# Patient Record
Sex: MALE | Race: WHITE | HISPANIC OR LATINO | URBAN - METROPOLITAN AREA
[De-identification: names, ages, dates, MRNs, and addresses within clinical notes are randomized per-mention and may not be internally consistent; named-entity substitution may affect disease eponyms.]

---

## 2023-04-04 ENCOUNTER — OFFICE VISIT (OUTPATIENT)
Dept: URBAN - METROPOLITAN AREA CLINIC 29 | Facility: CLINIC | Age: 23
End: 2023-04-04
Payer: COMMERCIAL

## 2023-04-04 ENCOUNTER — WEB ENCOUNTER (OUTPATIENT)
Dept: URBAN - METROPOLITAN AREA CLINIC 29 | Facility: CLINIC | Age: 23
End: 2023-04-04

## 2023-04-04 VITALS
HEART RATE: 78 BPM | HEIGHT: 69 IN | WEIGHT: 215 LBS | SYSTOLIC BLOOD PRESSURE: 134 MMHG | BODY MASS INDEX: 31.84 KG/M2 | DIASTOLIC BLOOD PRESSURE: 83 MMHG

## 2023-04-04 DIAGNOSIS — K52.9 COLITIS: ICD-10-CM

## 2023-04-04 DIAGNOSIS — K92.1 HEMATOCHEZIA: ICD-10-CM

## 2023-04-04 PROCEDURE — 99203 OFFICE O/P NEW LOW 30 MIN: CPT | Performed by: INTERNAL MEDICINE

## 2023-04-04 NOTE — HPI-TODAY'S VISIT:
Mr. Kirk is a 22-year-old man who presents with constipation.  He was started on pain medication after ankle surgery 1 week ago.  He was not able to have a BM for 4 days.  He took an OTC laxative and developed abdominal pain, diarrhea and rectal bleeding.  He was 3-4 bowel movements a day.  He has been on Cipro/Flagyl for 4 days and up until yesterday was still having bloody stools.  He had a CT which showed colitis.

## 2023-04-17 ENCOUNTER — OFFICE VISIT (OUTPATIENT)
Dept: URBAN - METROPOLITAN AREA CLINIC 84 | Facility: CLINIC | Age: 23
End: 2023-04-17

## 2023-05-09 ENCOUNTER — OFFICE VISIT (OUTPATIENT)
Dept: URBAN - METROPOLITAN AREA TELEHEALTH 2 | Facility: TELEHEALTH | Age: 23
End: 2023-05-09
Payer: COMMERCIAL

## 2023-05-09 DIAGNOSIS — K52.9 COLITIS: ICD-10-CM

## 2023-05-09 PROCEDURE — 99214 OFFICE O/P EST MOD 30 MIN: CPT | Performed by: INTERNAL MEDICINE

## 2023-05-09 NOTE — HPI-TODAY'S VISIT:
Mr. Kirk is a 22-year old here for follow up of hematochezia.  He states his symptoms complete resolved about 3 weeks ago.  Currently, he has 1 formed bowel movements a day, that are non-bloody.  She has no abdominal pain currently.

## 2023-05-24 ENCOUNTER — DASHBOARD ENCOUNTERS (OUTPATIENT)
Age: 23
End: 2023-05-24